# Patient Record
Sex: FEMALE | Race: WHITE | URBAN - METROPOLITAN AREA
[De-identification: names, ages, dates, MRNs, and addresses within clinical notes are randomized per-mention and may not be internally consistent; named-entity substitution may affect disease eponyms.]

---

## 2023-09-28 ENCOUNTER — EMERGENCY (EMERGENCY)
Facility: HOSPITAL | Age: 38
LOS: 1 days | Discharge: HOME | End: 2023-09-28
Attending: EMERGENCY MEDICINE | Admitting: EMERGENCY MEDICINE
Payer: MEDICAID

## 2023-09-28 VITALS
DIASTOLIC BLOOD PRESSURE: 72 MMHG | RESPIRATION RATE: 16 BRPM | TEMPERATURE: 99 F | HEART RATE: 96 BPM | SYSTOLIC BLOOD PRESSURE: 119 MMHG | OXYGEN SATURATION: 100 %

## 2023-09-28 LAB
ALBUMIN SERPL ELPH-MCNC: 4.9 G/DL — SIGNIFICANT CHANGE UP (ref 3.3–5)
ALP SERPL-CCNC: 59 U/L — SIGNIFICANT CHANGE UP (ref 40–120)
ALT FLD-CCNC: 17 U/L — SIGNIFICANT CHANGE UP (ref 4–33)
ANION GAP SERPL CALC-SCNC: 13 MMOL/L — SIGNIFICANT CHANGE UP (ref 7–14)
APTT BLD: 32.1 SEC — SIGNIFICANT CHANGE UP (ref 24.5–35.6)
AST SERPL-CCNC: 20 U/L — SIGNIFICANT CHANGE UP (ref 4–32)
BASOPHILS # BLD AUTO: 0.04 K/UL — SIGNIFICANT CHANGE UP (ref 0–0.2)
BASOPHILS NFR BLD AUTO: 0.4 % — SIGNIFICANT CHANGE UP (ref 0–2)
BILIRUB SERPL-MCNC: 0.3 MG/DL — SIGNIFICANT CHANGE UP (ref 0.2–1.2)
BUN SERPL-MCNC: 15 MG/DL — SIGNIFICANT CHANGE UP (ref 7–23)
CALCIUM SERPL-MCNC: 9.7 MG/DL — SIGNIFICANT CHANGE UP (ref 8.4–10.5)
CHLORIDE SERPL-SCNC: 105 MMOL/L — SIGNIFICANT CHANGE UP (ref 98–107)
CO2 SERPL-SCNC: 19 MMOL/L — LOW (ref 22–31)
CREAT SERPL-MCNC: 0.59 MG/DL — SIGNIFICANT CHANGE UP (ref 0.5–1.3)
EGFR: 119 ML/MIN/1.73M2 — SIGNIFICANT CHANGE UP
EOSINOPHIL # BLD AUTO: 0.14 K/UL — SIGNIFICANT CHANGE UP (ref 0–0.5)
EOSINOPHIL NFR BLD AUTO: 1.4 % — SIGNIFICANT CHANGE UP (ref 0–6)
GLUCOSE SERPL-MCNC: 108 MG/DL — HIGH (ref 70–99)
HCT VFR BLD CALC: 40.9 % — SIGNIFICANT CHANGE UP (ref 34.5–45)
HGB BLD-MCNC: 14.6 G/DL — SIGNIFICANT CHANGE UP (ref 11.5–15.5)
IANC: 5.46 K/UL — SIGNIFICANT CHANGE UP (ref 1.8–7.4)
IMM GRANULOCYTES NFR BLD AUTO: 0.2 % — SIGNIFICANT CHANGE UP (ref 0–0.9)
INR BLD: 1.07 RATIO — SIGNIFICANT CHANGE UP (ref 0.85–1.18)
LYMPHOCYTES # BLD AUTO: 3.73 K/UL — HIGH (ref 1–3.3)
LYMPHOCYTES # BLD AUTO: 36.7 % — SIGNIFICANT CHANGE UP (ref 13–44)
MCHC RBC-ENTMCNC: 31.6 PG — SIGNIFICANT CHANGE UP (ref 27–34)
MCHC RBC-ENTMCNC: 35.7 GM/DL — SIGNIFICANT CHANGE UP (ref 32–36)
MCV RBC AUTO: 88.5 FL — SIGNIFICANT CHANGE UP (ref 80–100)
MONOCYTES # BLD AUTO: 0.78 K/UL — SIGNIFICANT CHANGE UP (ref 0–0.9)
MONOCYTES NFR BLD AUTO: 7.7 % — SIGNIFICANT CHANGE UP (ref 2–14)
NEUTROPHILS # BLD AUTO: 5.46 K/UL — SIGNIFICANT CHANGE UP (ref 1.8–7.4)
NEUTROPHILS NFR BLD AUTO: 53.6 % — SIGNIFICANT CHANGE UP (ref 43–77)
NRBC # BLD: 0 /100 WBCS — SIGNIFICANT CHANGE UP (ref 0–0)
NRBC # FLD: 0 K/UL — SIGNIFICANT CHANGE UP (ref 0–0)
PLATELET # BLD AUTO: 277 K/UL — SIGNIFICANT CHANGE UP (ref 150–400)
POTASSIUM SERPL-MCNC: 3.1 MMOL/L — LOW (ref 3.5–5.3)
POTASSIUM SERPL-SCNC: 3.1 MMOL/L — LOW (ref 3.5–5.3)
PROT SERPL-MCNC: 7.9 G/DL — SIGNIFICANT CHANGE UP (ref 6–8.3)
PROTHROM AB SERPL-ACNC: 12 SEC — SIGNIFICANT CHANGE UP (ref 9.5–13)
RBC # BLD: 4.62 M/UL — SIGNIFICANT CHANGE UP (ref 3.8–5.2)
RBC # FLD: 12.1 % — SIGNIFICANT CHANGE UP (ref 10.3–14.5)
SODIUM SERPL-SCNC: 137 MMOL/L — SIGNIFICANT CHANGE UP (ref 135–145)
TROPONIN T, HIGH SENSITIVITY RESULT: <6 NG/L — SIGNIFICANT CHANGE UP
WBC # BLD: 10.17 K/UL — SIGNIFICANT CHANGE UP (ref 3.8–10.5)
WBC # FLD AUTO: 10.17 K/UL — SIGNIFICANT CHANGE UP (ref 3.8–10.5)

## 2023-09-28 PROCEDURE — 70450 CT HEAD/BRAIN W/O DYE: CPT | Mod: 26,MA,59

## 2023-09-28 PROCEDURE — 70498 CT ANGIOGRAPHY NECK: CPT | Mod: 26,MA

## 2023-09-28 PROCEDURE — 0042T: CPT | Mod: MA

## 2023-09-28 PROCEDURE — 99285 EMERGENCY DEPT VISIT HI MDM: CPT

## 2023-09-28 PROCEDURE — 70496 CT ANGIOGRAPHY HEAD: CPT | Mod: 26,MA

## 2023-09-28 NOTE — CONSULT NOTE ADULT - ASSESSMENT
LKW:  NIHSS:    Baseline MRS:  Not a Teneceteplase candidate due to   Not a thrombectomy candidate due to no LVO    Impression:        Recommendations:    []  []  []  []  Discussed with Telestroke attending regarding decision against candidacy for Tenecteplase / thrombectomy. Will be formally staffed on morning rounds with attending. Recommendations will be complete once signed by attending.       LKW: 21:00 9/28  NIHSS: 6> imporved to 4 (due to effort dependent drift of all 4 extremities)  Baseline MRS: 0  Not a Tenecteplase candidate due to resolving symptoms, concern for possible seizure   Not a thrombectomy candidate due to no LVO    Impression:      Less likely acute infarct  Syncopal episode vs seizure vs migraine with neurologic features     Recommendations:    []  []  []  []  Discussed with Telestroke attending regarding decision against candidacy for Tenecteplase / thrombectomy. Will be formally staffed on morning rounds with attending. Recommendations will be complete once signed by attending.       LKW: 21:00 9/28  NIHSS: 6> improved to 4 (due to effort dependent drift of all 4 extremities)  Baseline MRS: 0  Not a Tenecteplase candidate due to resolving symptoms, concern for possible seizure   Not a thrombectomy candidate due to no LVO    Impression:      Less likely acute infarct  Syncopal episode vs seizure vs migraine with neurologic features     Recommendations:    []  1st line -  reglan 10 q6h, toradol 30 iv q8h, magnesium sulfate IV 1g, tylenol,   []  []  []      Discussed with Telestroke attending regarding decision against candidacy for Tenecteplase / thrombectomy. Will be formally staffed on morning rounds with attending. Recommendations will be complete once signed by attending.       LKW: 21:00 9/28  NIHSS: 6> improved to 4 (due to effort dependent drift of all 4 extremities)  Baseline MRS: 0  Not a Tenecteplase candidate due to resolving symptoms, concern for possible seizure   Not a thrombectomy candidate due to no LVO    Impression:      Less likely acute infarct  Syncopal episode vs seizure vs migraine with neurologic features     Recommendations:    []  1st line -  reglan 10 q6h, toradol 30 iv q8h, magnesium sulfate IV 1g, tylenol,   []   []  []      Discussed with Telestroke attending regarding decision against candidacy for Tenecteplase / thrombectomy. Will be formally staffed on morning rounds with attending. Recommendations will be complete once signed by attending. YAA MARISCAL is a 38y (1985) left handed woman with a PMHx significant for asthma, migraines, nephrolithiasis who presents as code stroke after episode of loss of consciousness and found to have facial droop.      LKW: 21:00 9/28  NIHSS: 6> improved to 4 (due to effort dependent drift of all 4 extremities)  Baseline MRS: 0  Not a Tenecteplase candidate due to resolving symptoms, concern for possible seizure   Not a thrombectomy candidate due to no LVO    Impression:      Less likely acute infarct  Syncopal episode vs seizure vs migraine with neurologic features     Recommendations:    [] Trial Migraine cocktail for headache: 1st line -  reglan 10 q6h, toradol 30 iv q8h, magnesium sulfate IV 1g, tylenol,   [] CDU for syncopal workup: EKG, telemetry, orthostatics, TTE  [] Defer additional imaging due to lower concern for acute ischemic event   [] 2 hour EEG     Discussed with Stroke fellow Bernardo Molina MD under supervision of Dr. Libman regarding decision against candidacy for Tenecteplase / thrombectomy. Will be formally staffed on morning rounds with attending. Recommendations will be complete once signed by attending. YAA MARISCAL is a 38y (1985) left handed woman with a PMHx significant for asthma, migraines, nephrolithiasis who presents as code stroke after episode of loss of consciousness and found to have facial droop.    LKW: 21:00 9/28  NIHSS: 6> improved to 0   Baseline MRS: 0  Not a Tenecteplase candidate due to resolving symptoms, concern for possible seizure   Not a thrombectomy candidate due to no LVO    Impression:      Episode of transient loss of consciousness associated with B/L face tingling and facial droop (now resolved), and headache. Non focal neuro exam, symptoms largely improved after patient given migraine cocktail. Less likely 2/2 acute ischemic infarct. Likely 2/2 vasovagal syncopal episode vs migraine with neurologic features, less likely seizure    Recommendations:    [] Trial Migraine cocktail for headache: 1st line -  reglan 10 q6h, toradol 30 iv q8h, magnesium sulfate IV 1g, tylenol,   [] CDU for syncopal workup: EKG, telemetry, orthostatics, TTE  [] Defer additional imaging due to lower concern for acute ischemic event   [] 2 hour EEG   [] Pending results of workup above, patient can ollow up with Neurology as outpatient. Patient can follow up with Dr. Ziggy Ferguson or Dr. Eliezer Munoz after discharge. Please instruct the patient/family to call [phone] to schedule an appointment within the next 1-2 weeks. Office is located at [address] [place] [address]. If pt is without insurance, patient can follow up with [facility], located in [address] [place] [address] at [address] [place] [address]. Patient/family can call [phone] to schedule this appointment.    Discussed with Stroke fellow Bernardo Molina MD under supervision of Dr. Libman regarding decision against candidacy for Tenecteplase / thrombectomy. Will be formally staffed on morning rounds with attending. Recommendations will be complete once signed by attending. YAA MARISCAL is a 38y (1985) left handed woman with a PMHx significant for asthma, migraines, nephrolithiasis who presents as code stroke after episode of loss of consciousness and found to have facial droop.    LKW: 21:00 9/28  NIHSS: 6> improved to 0   Baseline MRS: 0  Not a Tenecteplase candidate due to resolving symptoms, concern for possible seizure   Not a thrombectomy candidate due to no LVO    Impression:      Episode of transient loss of consciousness associated with B/L face tingling and facial droop (now resolved), and headache. Non focal neuro exam, symptoms largely improved after patient given migraine cocktail. Less likely 2/2 acute ischemic infarct. Likely 2/2 vasovagal syncopal episode vs migraine with neurologic features, less likely seizure    Recommendations:    [] Trial Migraine cocktail for headache: 1st line -  reglan 10 q6h, toradol 30 iv q8h, magnesium sulfate IV 1g, tylenol,   [] CDU for syncopal workup: EKG, telemetry, orthostatics, TTE  [] Defer additional imaging due to lower concern for acute ischemic event   [] 2 hour EEG   [] MRI w/ and w/o contrast epilepsy protocol  [] Pending results of workup above, patient can follow up with Neurology as outpatient. Patient can follow up with Dr. Ziggy Ferguson or Dr. Eliezer Munoz after discharge. Please instruct the patient/family to call [phone] to schedule an appointment within the next 1-2 weeks. Office is located at [address] [place] [id number]. If pt is without insurance, patient can follow up with [facility], located in [address] [place] [address] at [address] [place] [address]. Patient/family can call [phone] to schedule this appointment.    Discussed with Stroke fellow Bernardo Molina MD under supervision of Dr. Libman regarding decision against candidacy for Tenecteplase / thrombectomy. Will be formally staffed on morning rounds with attending. Recommendations will be complete once signed by attending. YAA MARISCAL is a 38y (1985) left handed woman with a PMHx significant for asthma, migraines, nephrolithiasis who presents as code stroke after episode of loss of consciousness and found to have facial droop.    LKW: 21:00 9/28  NIHSS: 6> improved to 0   Baseline MRS: 0  Not a Tenecteplase candidate due to resolving symptoms, concern for possible seizure   Not a thrombectomy candidate due to no LVO    Impression:      Episode of transient loss of consciousness associated with B/L face tingling and facial droop (now resolved), and headache. Non focal neuro exam, symptoms largely improved after patient given migraine cocktail. Not consistent with acute ischemic infarct. Likely 2/2 vasovagal syncopal episode vs migraine with neurologic features, seizure    Recommendations:    [] Trial Migraine cocktail for headache: 1st line -  reglan 10 q6h, toradol 30 iv q8h, magnesium sulfate IV 1g, tylenol,   [] CDU for syncopal workup: EKG, telemetry, orthostatics, TTE  [] Defer additional imaging due to lower concern for acute ischemic event   [] prolonged EEG today - already hooked up this AM.  [] MRI w/ and w/o contrast epilepsy protocol  [] Pending results of workup above, patient can follow up with Neurology as outpatient. Patient can follow up with Dr. Ziggy Ferguson or Dr. Eliezer Munoz after discharge. Please instruct the patient/family to call [phone] to schedule an appointment within the next 1-2 weeks. Office is located at [address] [place] [id number]. If pt is without insurance, patient can follow up with [facility], located in [address] [place] [address] at [address] [place] [address]. Patient/family can call [phone] to schedule this appointment.    Discussed with Stroke fellow Bernardo Molina MD under supervision of Dr. Libman regarding decision against candidacy for Tenecteplase / thrombectomy. Will be formally staffed on morning rounds with attending. Recommendations will be complete once signed by attending.

## 2023-09-28 NOTE — ED ADULT NURSE NOTE - OBJECTIVE STATEMENT
Zach RN note- Patient arrives to the ED as a code stroke for right sided facial droop and generalized weakness starting around 9pm after a fall. Per EMS patient fell at home and her pulsox was 86% on RA, 3L nasal cannula applied with effect. Stroke scale as documented. 18g IV placed to left arm. Labs sent as ordered. Report given to primary RN. Upon arrival to ED patient noted to have left sided facial droop. Drift noted in all four extremities. Safety maintained. Patient stable upon exiting the room.

## 2023-09-28 NOTE — ED ADULT NURSE NOTE - NSFALLRISKINTERV_ED_ALL_ED

## 2023-09-28 NOTE — ED ADULT TRIAGE NOTE - CHIEF COMPLAINT QUOTE
Code Stroke called- Pt presents with left sided facial droop. LKW 2100. felt dizzy and headache prior. no phx

## 2023-09-28 NOTE — CONSULT NOTE ADULT - ATTENDING COMMENTS
Per  - 5 minutes of LOC and then confused.  The patient remembers feeling B facial paresthesias and then "waking up" with her family around her.  She was definitely confused and had difficulty speaking.  No tongue bite and no incontinence. No jerking noted.  No h/o seizures, significant head trauma, developmental delay.  Sister and grandmother have seizures.     Mental status   Awake, alert, and oriented to person, time and place, Normal attention span and concentration, No aphasia.  No neglect.        Cranial Nerves   II: VFF    III, IV, VI: PERRL, EOMI.     VII: Facial strength is normal B/L.   VIII: Gross hearing is intact.     IX, X: Palate is midline and elevates symmetrically.     XI: Trapezius normal strength.     XII: Tongue midline without atrophy or fasciculations.     Motor exam    Muscle tone - no evidence of rigidity or resistance in all 4 extremities.    No atrophy or fasciculations   Muscle Strength: arms and legs, proximal and distal flexors and extensors are normal     No UE drift.    Reflexes   All present, normal, and symmetrical.     Plantars right: mute.     Plantars left: mute.       Coordination   Finger to nose: Normal.    Heel to shin: Normal.       A/P  Ms. Stanley is a 37 yo woman with an episode of prolonged LOC (5 minutes) followed by a period of confusion.    The differential diagnosis includes: not consistent with syncope, possible seizure.   Prolonged EEG today.  MRI brain w/wo gado, seizure protocol.  D/W patient and CDU team.   H/O migraine - please allow her to take her own ubrogepant PRN migraine.  Thank you

## 2023-09-28 NOTE — CONSULT NOTE ADULT - SUBJECTIVE AND OBJECTIVE BOX
Neurology - Consult Note    -  Spectra: [phone] ([facility]), [phone] ([facility])  -    HPI: Patient YAA MARISCAL is a 38y (1985) left handed woman with a PMHx significant for asthma, migraines, nephrolithiasis  who presents as code stroke after episode of loss of consciousness and found to have facial droop. No headstrike noted.   Initially EMS reported patient had R facial droop, however in EMS. NIHSS 5 in the field. Vitals were normal, except O2sat 86% initially. improved on 2L NC. Patient has never had any prior syncopal episodes. Reports she woke up this morning feeling unwell.     Whole face tingling  Drift of all extremities  L droop when smiling     NIHSS: 6  LKW: 9pm  MRS: 0       Review of Systems:   CONSTITUTIONAL: No fevers or chills  EYES AND ENT: No visual changes or no throat pain   NECK: No pain or stiffness  RESPIRATORY: No hemoptysis or shortness of breath  CARDIOVASCULAR: No chest pain or palpitations  GASTROINTESTINAL: No melena or hematochezia  GENITOURINARY: No dysuria or hematuria  NEUROLOGICAL: +As stated in HPI above  SKIN: No itching, burning, rashes, or lesions   All other review of systems is negative unless indicated above.    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 Independent with ADLs and     Medications:  MEDICATIONS  (STANDING):    MEDICATIONS  (PRN):    Vitals:  T(C): --  HR: --  BP: --  RR: --  SpO2: --    Physical Examination: INCOMPLETE  General - NAD  Cardiovascular - Peripheral pulses palpable, no edema  Eyes - Fundoscopy with flat, sharp optic discs and no hemorrhage or exudates; Fundoscopy not well visualized; Fundoscopy not performed due to safety precautions in the setting of the COVID-19 pandemic    Neurologic Exam:  Mental status - Awake, Alert, Oriented to person, place, and time. Speech fluent, repetition and naming intact. Follows simple and complex commands. Attention/concentration, recent and remote memory (including registration and recall), and fund of knowledge intact    Cranial nerves - PERRLA, VFF, EOMI, face sensation (V1-V3) intact b/l, facial strength intact without asymmetry b/l, hearing intact b/l, palate with symmetric elevation, trapezius OR sternocleidomastiod 5/5 strength b/l, tongue midline on protrusion with full lateral movement    Motor - Normal bulk and tone throughout. No pronator drift.  Strength testing            Deltoid      Biceps      Triceps     Wrist Extension    Wrist Flexion     Interossei         R            5                 5               5                     5                              5                        5                 5  L             5                 5               5                     5                              5                        5                 5              Hip Flexion    Hip Extension    Knee Flexion    Knee Extension    Dorsiflexion    Plantar Flexion  R              5                           5                       5                           5                            5                          5  L              5                           5                        5                           5                            5                          5    Sensation - Light touch/temperature OR pain/vibration intact throughout    DTR's -             Biceps      Triceps     Brachioradialis      Patellar    Ankle    Toes/plantar response  R             2+             2+                  2+                       2+            2+                 Down  L              2+             2+                 2+                        2+           2+                 Down    Coordination - Finger to Nose intact b/l. No tremors appreciated    Gait and station - Not assessed     Labs:      CAPILLARY BLOOD GLUCOSE        Radiology:     Neurology - Consult Note    -  Spectra: [phone] ([facility]), [phone] ([facility])  -    HPI: Patient YAA MARISCAL is a 38y (1985) left handed woman with a PMHx significant for asthma, migraines, nephrolithiasis  who presents as code stroke after episode of loss of consciousness and found to have facial droop. No headstrike or seizure like activity noted.   Initially EMS reported patient had R facial droop, however in ED, noted to have L facial droop. NIHSS 5 in the field. Vitals were normal, except O2sat 86% initially, which improved on 2L NC. Patient has never had any prior syncopal episodes. Reports she woke up this morning feeling unwell per the . Not on any AC/AP.     NIHSS: 6 (sensory deficit; Whole face tingling, 1 each for Drift of all extremities, 1 for L droop when smiling)   LKW: 9pm  MRS: 0     Review of Systems:   CONSTITUTIONAL: No fevers or chills  EYES AND ENT: No visual changes or no throat pain   NECK: No pain or stiffness  RESPIRATORY: No hemoptysis or shortness of breath  CARDIOVASCULAR: No chest pain or palpitations  GASTROINTESTINAL: No melena or hematochezia  GENITOURINARY: No dysuria or hematuria  NEUROLOGICAL: +As stated in HPI above  SKIN: No itching, burning, rashes, or lesions   All other review of systems is negative unless indicated above.    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 Independent with ADLs and     Medications:  MEDICATIONS  (STANDING):    MEDICATIONS  (PRN):    Vitals:  T(C): --  HR: --  BP: --  RR: --  SpO2: --    Physical Examination:   General - NAD  Cardiovascular - Peripheral pulses palpable, no edema  Eyes -  Fundoscopy not performed due to safety precautions in the setting of the COVID-19 pandemic    Neurologic Exam:  Mental status - Awake, Alert, Oriented to person, place, and time. Speech fluent, repetition and naming intact. Follows simple and complex commands. Attention/concentration, recent and remote memory (including registration and recall), and fund of knowledge intact    Cranial nerves - PERRLA, VFF, EOMI, face sensation (V1-V3) intact b/l, facial strength intact without asymmetry b/l, hearing intact b/l, palate with symmetric elevation, trapezius 5/5 strength b/l, tongue midline on protrusion with full lateral movement    Motor - Normal bulk and tone throughout. No pronator drift.  Strength testing            Deltoid      Biceps      Triceps     Wrist Extension    Wrist Flexion     Interossei         R            5                 5               5                     5                              5                        5                 5  L             5                 5               5                     5                              5                        5                 5              Hip Flexion    Hip Extension    Knee Flexion    Knee Extension    Dorsiflexion    Plantar Flexion  R              5                           5                       5                           5                            5                          5  L              5                           5                        5                           5                            5                          5    Sensation - Light touch/temperature OR pain/vibration intact throughout    DTR's -             Biceps      Triceps     Brachioradialis      Patellar    Ankle    Toes/plantar response  R             2+             2+                  2+                       2+            2+                 Down  L              2+             2+                 2+                        2+           2+                 Down    Coordination - Finger to Nose intact b/l. No tremors appreciated    Gait and station - Not assessed     Labs:      CAPILLARY BLOOD GLUCOSE        Radiology:     Neurology - Consult Note    -  Spectra: [phone] ([facility]), [phone] ([facility])  -    HPI: Patient YAA MARISCAL is a 38y (1985) left handed woman with a PMHx significant for asthma, migraines, nephrolithiasis who presents as code stroke after episode of loss of consciousness and found to have facial droop. No headstrike or seizure like activity noted.   Initially EMS reported patient had R facial droop, however in ED, noted to have L facial droop. NIHSS 5 in the field. Vitals were normal, except O2sat 86% initially, which improved on 2L NC. Patient has never had any prior syncopal episodes. Reports she woke up this morning feeling unwell per the . Not on any AC/AP.     NIHSS: 6 (sensory deficit; Whole face tingling, 1 each for Drift of all extremities, 1 for L droop when smiling)   LKW: 9pm  MRS: 0     Review of Systems:   CONSTITUTIONAL: No fevers or chills  EYES AND ENT: No visual changes or no throat pain   NECK: No pain or stiffness  RESPIRATORY: No hemoptysis or shortness of breath  CARDIOVASCULAR: No chest pain or palpitations  GASTROINTESTINAL: No melena or hematochezia  GENITOURINARY: No dysuria or hematuria  NEUROLOGICAL: +As stated in HPI above  SKIN: No itching, burning, rashes, or lesions   All other review of systems is negative unless indicated above.    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 Independent with ADLs and     Medications:  MEDICATIONS  (STANDING):    MEDICATIONS  (PRN):    Vitals:  T(C): --  HR: --  BP: --  RR: --  SpO2: --    Physical Examination:   General - NAD  Cardiovascular - Peripheral pulses palpable, no edema  Eyes -  Fundoscopy not performed due to safety precautions in the setting of the COVID-19 pandemic    Neurologic Exam:  Mental status - Awake, Alert, Oriented to person, place, and time. Speech fluent, repetition and naming intact. Follows simple and complex commands. Attention/concentration, recent and remote memory (including registration and recall), and fund of knowledge intact    Cranial nerves - PERRLA, VFF, EOMI, face sensation (V1-V3) intact b/l, facial strength intact without asymmetry b/l, hearing intact b/l, palate with symmetric elevation, trapezius 5/5 strength b/l, tongue midline on protrusion with full lateral movement    Motor - Normal bulk and tone throughout. No pronator drift.  Strength testing            Deltoid      Biceps      Triceps     Wrist Extension    Wrist Flexion     Interossei         R            5                 5               5                     5                              5                        5                 5  L             5                 5               5                     5                              5                        5                 5              Hip Flexion    Hip Extension    Knee Flexion    Knee Extension    Dorsiflexion    Plantar Flexion  R              5                           5                       5                           5                            5                          5  L              5                           5                        5                           5                            5                          5    Sensation - Light touch/temperature OR pain/vibration intact throughout    DTR's -             Biceps      Triceps     Brachioradialis      Patellar    Ankle    Toes/plantar response  R             2+             2+                  2+                       2+            2+                 Down  L              2+             2+                 2+                        2+           2+                 Down    Coordination - Finger to Nose intact b/l. No tremors appreciated    Gait and station - Not assessed     Labs:      CAPILLARY BLOOD GLUCOSE        Radiology:     Neurology - Consult Note    -  Spectra: [phone] ([facility]), [phone] ([facility])  -    HPI: Patient YAA MARISCAL is a 38y (1985) left handed woman with a PMHx significant for asthma, migraines, nephrolithiasis who presents as code stroke after episode of loss of consciousness and found to have facial droop. No headstrike or seizure like activity noted.   Initially EMS reported patient had R facial droop, however in ED, noted to have L facial droop. NIHSS 5 in the field. Vitals were normal, except O2sat 86% initially, which improved on 2L NC. Patient has never had any prior syncopal episodes. Reports she woke up this morning feeling unwell per the . Not on any AC/AP.     NIHSS: 6 (sensory deficit; Whole face tingling, 1 each for Drift of all extremities, 1 for L droop when smiling)   LKW: 9pm  MRS: 0     Review of Systems:   CONSTITUTIONAL: No fevers or chills  EYES AND ENT: No visual changes or no throat pain   NECK: No pain or stiffness  RESPIRATORY: No hemoptysis or shortness of breath  CARDIOVASCULAR: No chest pain or palpitations  GASTROINTESTINAL: No melena or hematochezia  GENITOURINARY: No dysuria or hematuria  NEUROLOGICAL: +As stated in HPI above  SKIN: No itching, burning, rashes, or lesions   All other review of systems is negative unless indicated above.    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 Independent with ADLs and     Medications:  MEDICATIONS  (STANDING):    MEDICATIONS  (PRN):    Vitals:  T(C): --  HR: --  BP: --  RR: --  SpO2: --    Physical Examination:   General - NAD  Cardiovascular - Peripheral pulses palpable, no edema  Eyes -  Fundoscopy not performed due to safety precautions in the setting of the COVID-19 pandemic    Neurologic Exam:  Mental status - Awake, Alert, Oriented to person, place, and time. Speech fluent, repetition and naming intact. Follows simple and complex commands. Attention/concentration, recent and remote memory (including registration and recall), and fund of knowledge intact    Cranial nerves - PERRLA, VFF, EOMI, face sensation (V1-V3) intact b/l, facial strength intact without asymmetry b/l, hearing intact b/l, palate with symmetric elevation, trapezius 5/5 strength b/l, tongue midline on protrusion with full lateral movement    Motor - Normal bulk and tone throughout. No pronator drift.  Strength testing            Deltoid      Biceps      Triceps     Wrist Extension    Wrist Flexion     Interossei         R            5                 5               5                     5                              5                        5                 5  L             5                 5               5                     5                              5                        5                 5              Hip Flexion    Hip Extension    Knee Flexion    Knee Extension    Dorsiflexion    Plantar Flexion  R              5                           5                       5                           5                            5                          5  L              5                           5                        5                           5                            5                          5    Sensation - Light touch intact throughout    DTR's -             Biceps      Triceps     Brachioradialis      Patellar    Ankle    Toes/plantar response  R             2+             2+                  2+                       2+            2+                 Down  L              2+             2+                 2+                        2+           2+                 Down    Coordination - Finger to Nose intact b/l. No tremors appreciated    Gait and station - Not assessed     Labs:      CAPILLARY BLOOD GLUCOSE        Radiology:     Neurology - Consult Note    -  Spectra: [phone] ([facility]), [phone] ([facility])  -    HPI: Patient YAA MARISCAL is a 38y (1985) left handed woman with a PMHx significant for asthma, migraines, nephrolithiasis who presents as code stroke after episode of loss of consciousness lasting 5 minutes and found to have facial droop. Patient says she felt her whole face start to tingle, then she lost consciousness. Denies any prodromal symptoms such as warmth or palpitations. No head strike or seizure like activity noted, bowel/ bladder incontinence noted.  reports he found patient on the floor in the kitchen.  Initially EMS, reported patient had R facial droop, however in ED, noted to have L facial droop. NIHSS 5 in the field. Vitals were normal, except O2sat 86% initially, which improved on 2L NC. Patient has never had any prior syncopal episodes or history of seizures. Reports she woke up this morning feeling unwell per the . Not on any AC/AP. She takes ubrelvy daily for migraines Patient reports headache which is similar to her prior migraines (frontal and retro-orbital associated with phonophobia).    NIHSS: 6 (sensory deficit; whole face tingling, 1 each for drift of all extremities, 1 for L droop when smiling) > improved to 0  LKW: 9pm  MRS: 0     Review of Systems:   CONSTITUTIONAL: No fevers or chills  EYES AND ENT: No visual changes or no throat pain   NECK: No pain or stiffness  RESPIRATORY: No hemoptysis or shortness of breath  CARDIOVASCULAR: No chest pain or palpitations  GASTROINTESTINAL: No melena or hematochezia  GENITOURINARY: No dysuria or hematuria  NEUROLOGICAL: +As stated in HPI above  SKIN: No itching, burning, rashes, or lesions   All other review of systems is negative unless indicated above.    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 Independent with ADLs and     Medications:  MEDICATIONS  (STANDING):    MEDICATIONS  (PRN):    Vitals:  T(C): --  HR: --  BP: --  RR: --  SpO2: --    Physical Examination:   General - NAD  Cardiovascular - Peripheral pulses palpable, no edema  Eyes -  Fundoscopy not performed due to safety precautions in the setting of the COVID-19 pandemic    Neurologic Exam:  Mental status - Awake, Alert, Oriented to person, place, and time. Speech fluent, repetition and naming intact. Follows simple and complex commands. Attention/concentration, recent and remote memory (including registration and recall), and fund of knowledge intact    Cranial nerves - PERRLA, VFF, EOMI, face sensation (V1-V3) intact b/l, facial strength intact without asymmetry b/l, hearing intact b/l, palate with symmetric elevation, trapezius 5/5 strength b/l, tongue midline on protrusion with full lateral movement    Motor - Normal bulk and tone throughout. No pronator drift.  Strength testing            Deltoid      Biceps      Triceps     Wrist Extension    Wrist Flexion     Interossei         R            5                 5               5                     5                              5                        5                 5  L             5                 5               5                     5                              5                        5                 5              Hip Flexion    Hip Extension    Knee Flexion    Knee Extension    Dorsiflexion    Plantar Flexion  R              5                           5                       5                           5                            5                          5  L              5                           5                        5                           5                            5                          5    Sensation - Light touch intact throughout    DTR's -             Biceps      Triceps     Brachioradialis      Patellar    Ankle    Toes/plantar response  R             2+             2+                  2+                       2+            2+                 Down  L              2+             2+                 2+                        2+           2+                 Down    Coordination - Finger to Nose intact b/l. No tremors appreciated    Gait and station - Not assessed     Labs:      CAPILLARY BLOOD GLUCOSE        Radiology:     Neurology - Consult Note    -  Spectra: [phone] ([facility]), [phone] ([facility])  -    HPI: Patient YAA MARISCAL is a 38y (1985) left handed woman with a PMHx significant for asthma, migraines, nephrolithiasis who presents as code stroke after episode of loss of consciousness lasting 5 minutes and found to have facial droop. Patient says she felt her whole face start to tingle, then she lost consciousness. Denies any prodromal symptoms such as warmth or palpitations. No head strike or seizure like activity noted, bowel/ bladder incontinence noted.  reports he found patient on the floor in the kitchen.  Initially EMS, reported patient had R facial droop, however in ED, noted to have L facial droop. NIHSS 5 in the field. Vitals were normal, except O2sat 86% initially, which improved on 2L NC. Patient has never had any prior syncopal episodes or history of seizures. Reports she woke up this morning feeling unwell per the . Not on any AC/AP. She takes ubrelvy daily for migraines Patient reports headache which is similar to her prior migraines (frontal and retro-orbital associated with phonophobia).    NIHSS: 6 (sensory deficit; whole face tingling, 1 each for drift of all extremities, 1 for L droop when smiling) > improved to 0  LKW: 9pm  MRS: 0     Review of Systems:   CONSTITUTIONAL: No fevers or chills  EYES AND ENT: No visual changes or no throat pain   NECK: No pain or stiffness  RESPIRATORY: No hemoptysis or shortness of breath  CARDIOVASCULAR: No chest pain or palpitations  GASTROINTESTINAL: No melena or hematochezia  GENITOURINARY: No dysuria or hematuria  NEUROLOGICAL: +As stated in HPI above  SKIN: No itching, burning, rashes, or lesions   All other review of systems is negative unless indicated above.    Allergies:  No Known Allergies    PMHx/PSHx/Family Hx: As above, otherwise see below       Social Hx:  No current use of tobacco, alcohol, or illicit drugs  Lives with  and children, she is a stay at home mom. Independent with ADLs and able to ambulate independently    Medications:  MEDICATIONS  (STANDING):  MEDICATIONS  (PRN):    Vitals:    T(C): 37.1 (09-28-23 @ 23:52), Max: 37.2 (09-28-23 @ 22:33)  HR: 92 (09-28-23 @ 23:52) (92 - 96)  BP: 111/67 (09-28-23 @ 23:52) (111/67 - 119/72)  RR: 16 (09-28-23 @ 23:52) (16 - 16)  SpO2: 99% (09-28-23 @ 23:52) (99% - 100%)    Physical Examination:   General - NAD  Cardiovascular - Peripheral pulses palpable, no edema  Eyes -  Fundoscopy not performed due to safety precautions in the setting of the COVID-19 pandemic    Neurologic Exam:  Mental status - Awake, Alert, Oriented to person, place, and time. Speech fluent, repetition and naming intact. Follows simple and complex commands. Attention/concentration, recent and remote memory (including registration and recall), and fund of knowledge intact    Cranial nerves - PERRLA, VFF, EOMI, face sensation (V1-V3) intact b/l, facial strength intact without asymmetry b/l, hearing intact b/l, palate with symmetric elevation, trapezius 5/5 strength b/l, tongue midline on protrusion with full lateral movement    Motor - Normal bulk and tone throughout. No pronator drift.  Strength testing            Deltoid      Biceps      Triceps     Wrist Extension    Wrist Flexion     Interossei         R            5                 5               5                     5                              5                        5                 5  L             5                 5               5                     5                              5                        5                 5              Hip Flexion    Hip Extension    Knee Flexion    Knee Extension    Dorsiflexion    Plantar Flexion  R              5                           5                       5                           5                            5                          5  L              5                           5                        5                           5                            5                          5    Sensation - Light touch intact throughout    DTR's -             Biceps      Triceps     Brachioradialis      Patellar    Ankle    Toes/plantar response  R             2+             2+                  2+                       2+            2+                 Down  L              2+             2+                 2+                        2+           2+                 Down    Coordination - Finger to Nose intact b/l. No tremors appreciated    Gait and station - Not assessed     Labs:    .  LABS:                         14.6   10.17 )-----------( 277      ( 28 Sep 2023 22:00 )             40.9     09-28    137  |  105  |  15  ----------------------------<  108<H>  3.1<L>   |  19<L>  |  0.59    Ca    9.7      28 Sep 2023 22:00    TPro  7.9  /  Alb  4.9  /  TBili  0.3  /  DBili  x   /  AST  20  /  ALT  17  /  AlkPhos  59  09-28    PT/INR - ( 28 Sep 2023 22:00 )   PT: 12.0 sec;   INR: 1.07 ratio         PTT - ( 28 Sep 2023 22:00 )  PTT:32.1 sec  Urinalysis Basic - ( 28 Sep 2023 22:00 )    Color: x / Appearance: x / SG: x / pH: x  Gluc: 108 mg/dL / Ketone: x  / Bili: x / Urobili: x   Blood: x / Protein: x / Nitrite: x   Leuk Esterase: x / RBC: x / WBC x   Sq Epi: x / Non Sq Epi: x / Bacteria: x      RADIOLOGY, EKG & ADDITIONAL TESTS:     CTH  IMPRESSION:    No acute intracranial hemorrhage, mass effect, or CT evidence of an acute   vascular territorial infarct.  Mild cerebral and cerebellar volume loss for age.      CT Brain Perfusion Maps Stroke (09.28.23 @ 22:20)   CT ANGIOGRAPHY:    There is a three-vessel aortic arch. The common carotid arteries are   patent from the origins to the bifurcations. There is no significant   atherosclerotic disease at the carotid bifurcations. The cervical   internal carotid arteries are patent without stenosis based on NASCET   criteria. The cervical vertebral arteries are patent from the origins to   the skull base. Vertebral arteries are codominant. There is no evidence   of cervical carotid or vertebral artery dissection.    The skull base and intracranial carotid arteries are patent without   significant stenosis. The proximal MCAs and ACAs are patent without   significant stenosis. The anterior communicating artery is visualized.   Distal TAMIKA and MCA branches are grossly symmetric.    The skull base and intradural vertebral arteries and basilar artery are   patent without significant stenosis. The proximal PCAs are patent without   significant stenosis. Bilateral posterior communicating arteries are   visualized. The superior cerebellar artery origins, a right AICA/PICA, a   left AICA, and a left PICA are identified.    There is no evidence of an intracranial arterial aneurysm or arterial   venous malformation on CTA.    Intracranial superficial and deep venous sinus system are grossly   unremarkable.    The regional soft tissues of the neck are otherwise unremarkable. The   lung apices are clear. There are mild degenerative changes of the spine.    CT PERFUSION:    Perfusion parameters are reported as follows:    CBF < 30%: 0 mL  TMax > 6s: 0 mL  Mismatch volume: 0 mL  Mismatch ratio: None    IMPRESSION:    CTA NECK: No stenosis of the cervical carotid arteries based on NASCET   criteria. Patent cervical vertebral arteries. No evidence of cervical   carotid or vertebral artery dissection.    CTA HEAD: No high-grade stenosis or occlusion of the major proximal   arterial branches.    CT PERFUSION: No evidence of a core infarct or tissue at risk.   Neurology - Consult Note    -  Spectra: [phone] ([facility]), [phone] ([facility])  -    HPI: Patient YAA MARISCAL is a 38y (1985) left handed woman with a PMHx significant for asthma, migraines, nephrolithiasis who presents as code stroke after episode of loss of consciousness lasting 5 minutes and found to have facial droop. Patient says she felt her whole face start to tingle, then she lost consciousness. Denies any prodromal symptoms such as warmth or palpitations. No head strike or seizure like activity noted, bowel/ bladder incontinence noted.  reports he found patient on the floor in the kitchen.  Initially EMS, reported patient had R facial droop, however in ED, noted to have L facial droop. NIHSS 5 in the field. Vitals were normal, except O2sat 86% initially, which improved on 2L NC. Patient has never had any prior syncopal episodes or history of seizures. Reports she woke up this morning feeling unwell per the . Not on any AC/AP. She takes Ubrelvy daily for migraines Patient reports headache which is similar to her prior migraines (frontal and retro-orbital associated with phonophobia). Family hx significant for grandmother and sister who has epilepsy/ unspecified seizure disorder.     NIHSS: 6 (sensory deficit; whole face tingling, 1 each for drift of all extremities, 1 for L droop when smiling) > improved to 0  LKW: 9pm  MRS: 0     Review of Systems:   CONSTITUTIONAL: No fevers or chills  EYES AND ENT: No visual changes or no throat pain   NECK: No pain or stiffness  RESPIRATORY: No hemoptysis or shortness of breath  CARDIOVASCULAR: No chest pain or palpitations  GASTROINTESTINAL: No melena or hematochezia  GENITOURINARY: No dysuria or hematuria  NEUROLOGICAL: +As stated in HPI above  SKIN: No itching, burning, rashes, or lesions   All other review of systems is negative unless indicated above.    Allergies:  No Known Allergies    PMHx/PSHx/Family Hx: As above, otherwise see below       Social Hx:  No current use of tobacco, alcohol, or illicit drugs  Lives with  and children, she is a stay at home mom. Independent with ADLs and able to ambulate independently    Medications:  MEDICATIONS  (STANDING):  MEDICATIONS  (PRN):    Vitals:    T(C): 37.1 (09-28-23 @ 23:52), Max: 37.2 (09-28-23 @ 22:33)  HR: 92 (09-28-23 @ 23:52) (92 - 96)  BP: 111/67 (09-28-23 @ 23:52) (111/67 - 119/72)  RR: 16 (09-28-23 @ 23:52) (16 - 16)  SpO2: 99% (09-28-23 @ 23:52) (99% - 100%)    Physical Examination:   General - NAD  Cardiovascular - Peripheral pulses palpable, no edema  Eyes -  Fundoscopy not performed due to safety precautions in the setting of the COVID-19 pandemic    Neurologic Exam:  Mental status - Awake, Alert, Oriented to person, place, and time. Speech fluent, repetition and naming intact. Follows simple and complex commands. Attention/concentration, recent and remote memory (including registration and recall), and fund of knowledge intact    Cranial nerves - PERRLA, VFF, EOMI, face sensation (V1-V3) intact b/l, facial strength intact without asymmetry b/l, hearing intact b/l, palate with symmetric elevation, trapezius 5/5 strength b/l, tongue midline on protrusion with full lateral movement    Motor - Normal bulk and tone throughout. No pronator drift.  Strength testing            Deltoid      Biceps      Triceps     Wrist Extension    Wrist Flexion     Interossei         R            5                 5               5                     5                              5                        5                 5  L             5                 5               5                     5                              5                        5                 5              Hip Flexion    Hip Extension    Knee Flexion    Knee Extension    Dorsiflexion    Plantar Flexion  R              5                           5                       5                           5                            5                          5  L              5                           5                        5                           5                            5                          5    Sensation - Light touch intact throughout    DTR's -             Biceps      Triceps     Brachioradialis      Patellar    Ankle    Toes/plantar response  R             2+             2+                  2+                       2+            2+                 Down  L              2+             2+                 2+                        2+           2+                 Down    Coordination - Finger to Nose intact b/l. No tremors appreciated    Gait and station - Not assessed     Labs:    .  LABS:                         14.6   10.17 )-----------( 277      ( 28 Sep 2023 22:00 )             40.9     09-28    137  |  105  |  15  ----------------------------<  108<H>  3.1<L>   |  19<L>  |  0.59    Ca    9.7      28 Sep 2023 22:00    TPro  7.9  /  Alb  4.9  /  TBili  0.3  /  DBili  x   /  AST  20  /  ALT  17  /  AlkPhos  59  09-28    PT/INR - ( 28 Sep 2023 22:00 )   PT: 12.0 sec;   INR: 1.07 ratio         PTT - ( 28 Sep 2023 22:00 )  PTT:32.1 sec  Urinalysis Basic - ( 28 Sep 2023 22:00 )    Color: x / Appearance: x / SG: x / pH: x  Gluc: 108 mg/dL / Ketone: x  / Bili: x / Urobili: x   Blood: x / Protein: x / Nitrite: x   Leuk Esterase: x / RBC: x / WBC x   Sq Epi: x / Non Sq Epi: x / Bacteria: x      RADIOLOGY, EKG & ADDITIONAL TESTS:     CTH  IMPRESSION:    No acute intracranial hemorrhage, mass effect, or CT evidence of an acute   vascular territorial infarct.  Mild cerebral and cerebellar volume loss for age.      CT Brain Perfusion Maps Stroke (09.28.23 @ 22:20)   CT ANGIOGRAPHY:    There is a three-vessel aortic arch. The common carotid arteries are   patent from the origins to the bifurcations. There is no significant   atherosclerotic disease at the carotid bifurcations. The cervical   internal carotid arteries are patent without stenosis based on NASCET   criteria. The cervical vertebral arteries are patent from the origins to   the skull base. Vertebral arteries are codominant. There is no evidence   of cervical carotid or vertebral artery dissection.    The skull base and intracranial carotid arteries are patent without   significant stenosis. The proximal MCAs and ACAs are patent without   significant stenosis. The anterior communicating artery is visualized.   Distal TAMIKA and MCA branches are grossly symmetric.    The skull base and intradural vertebral arteries and basilar artery are   patent without significant stenosis. The proximal PCAs are patent without   significant stenosis. Bilateral posterior communicating arteries are   visualized. The superior cerebellar artery origins, a right AICA/PICA, a   left AICA, and a left PICA are identified.    There is no evidence of an intracranial arterial aneurysm or arterial   venous malformation on CTA.    Intracranial superficial and deep venous sinus system are grossly   unremarkable.    The regional soft tissues of the neck are otherwise unremarkable. The   lung apices are clear. There are mild degenerative changes of the spine.    CT PERFUSION:    Perfusion parameters are reported as follows:    CBF < 30%: 0 mL  TMax > 6s: 0 mL  Mismatch volume: 0 mL  Mismatch ratio: None    IMPRESSION:    CTA NECK: No stenosis of the cervical carotid arteries based on NASCET   criteria. Patent cervical vertebral arteries. No evidence of cervical   carotid or vertebral artery dissection.    CTA HEAD: No high-grade stenosis or occlusion of the major proximal   arterial branches.    CT PERFUSION: No evidence of a core infarct or tissue at risk.

## 2023-09-29 PROCEDURE — 93010 ELECTROCARDIOGRAM REPORT: CPT

## 2023-09-29 PROCEDURE — 95718 EEG PHYS/QHP 2-12 HR W/VEEG: CPT

## 2023-09-29 PROCEDURE — 70553 MRI BRAIN STEM W/O & W/DYE: CPT | Mod: 26,MA

## 2023-09-29 PROCEDURE — 93306 TTE W/DOPPLER COMPLETE: CPT | Mod: 26

## 2023-09-29 PROCEDURE — 99236 HOSP IP/OBS SAME DATE HI 85: CPT

## 2023-09-29 PROCEDURE — 99285 EMERGENCY DEPT VISIT HI MDM: CPT

## 2023-09-29 RX ORDER — ACETAMINOPHEN 500 MG
1000 TABLET ORAL ONCE
Refills: 0 | Status: COMPLETED | OUTPATIENT
Start: 2023-09-29 | End: 2023-09-29

## 2023-09-29 RX ORDER — MAGNESIUM SULFATE 500 MG/ML
1 VIAL (ML) INJECTION ONCE
Refills: 0 | Status: COMPLETED | OUTPATIENT
Start: 2023-09-29 | End: 2023-09-29

## 2023-09-29 RX ORDER — KETOROLAC TROMETHAMINE 30 MG/ML
30 SYRINGE (ML) INJECTION EVERY 6 HOURS
Refills: 0 | Status: DISCONTINUED | OUTPATIENT
Start: 2023-09-29 | End: 2023-09-29

## 2023-09-29 RX ORDER — POTASSIUM CHLORIDE 20 MEQ
40 PACKET (EA) ORAL ONCE
Refills: 0 | Status: COMPLETED | OUTPATIENT
Start: 2023-09-29 | End: 2023-09-29

## 2023-09-29 RX ORDER — SODIUM CHLORIDE 9 MG/ML
1000 INJECTION INTRAMUSCULAR; INTRAVENOUS; SUBCUTANEOUS
Refills: 0 | Status: DISCONTINUED | OUTPATIENT
Start: 2023-09-29 | End: 2023-10-03

## 2023-09-29 RX ORDER — ACETAMINOPHEN 500 MG
975 TABLET ORAL ONCE
Refills: 0 | Status: COMPLETED | OUTPATIENT
Start: 2023-09-29 | End: 2023-09-29

## 2023-09-29 RX ORDER — METOCLOPRAMIDE HCL 10 MG
10 TABLET ORAL ONCE
Refills: 0 | Status: COMPLETED | OUTPATIENT
Start: 2023-09-29 | End: 2023-09-29

## 2023-09-29 RX ORDER — POTASSIUM CHLORIDE 20 MEQ
10 PACKET (EA) ORAL
Refills: 0 | Status: DISCONTINUED | OUTPATIENT
Start: 2023-09-29 | End: 2023-09-29

## 2023-09-29 RX ORDER — SODIUM CHLORIDE 9 MG/ML
1000 INJECTION INTRAMUSCULAR; INTRAVENOUS; SUBCUTANEOUS ONCE
Refills: 0 | Status: COMPLETED | OUTPATIENT
Start: 2023-09-29 | End: 2023-09-29

## 2023-09-29 RX ORDER — METOCLOPRAMIDE HCL 10 MG
10 TABLET ORAL EVERY 6 HOURS
Refills: 0 | Status: DISCONTINUED | OUTPATIENT
Start: 2023-09-29 | End: 2023-10-03

## 2023-09-29 RX ADMIN — Medication 30 MILLIGRAM(S): at 20:38

## 2023-09-29 RX ADMIN — Medication 40 MILLIEQUIVALENT(S): at 07:04

## 2023-09-29 RX ADMIN — SODIUM CHLORIDE 125 MILLILITER(S): 9 INJECTION INTRAMUSCULAR; INTRAVENOUS; SUBCUTANEOUS at 15:43

## 2023-09-29 RX ADMIN — SODIUM CHLORIDE 1000 MILLILITER(S): 9 INJECTION INTRAMUSCULAR; INTRAVENOUS; SUBCUTANEOUS at 00:18

## 2023-09-29 RX ADMIN — Medication 30 MILLIGRAM(S): at 20:08

## 2023-09-29 RX ADMIN — Medication 975 MILLIGRAM(S): at 18:22

## 2023-09-29 RX ADMIN — Medication 10 MILLIGRAM(S): at 00:19

## 2023-09-29 RX ADMIN — Medication 10 MILLIGRAM(S): at 20:08

## 2023-09-29 RX ADMIN — Medication 400 MILLIGRAM(S): at 00:12

## 2023-09-29 RX ADMIN — Medication 100 GRAM(S): at 20:27

## 2023-09-29 RX ADMIN — Medication 40 MILLIEQUIVALENT(S): at 03:20

## 2023-09-29 RX ADMIN — SODIUM CHLORIDE 125 MILLILITER(S): 9 INJECTION INTRAMUSCULAR; INTRAVENOUS; SUBCUTANEOUS at 05:30

## 2023-09-29 RX ADMIN — Medication 100 GRAM(S): at 05:28

## 2023-09-29 NOTE — ED CDU PROVIDER INITIAL DAY NOTE - PROGRESS NOTE DETAILS
Patient seen and examined this AM.  Pt noted to have improved weakness on LUE and RUE, with no signs of facial droop at this times. NEURO: alert, CN 2-12 intact, reflexes nl, sensation nl, coordination nl, finger to nose nl, romberg negative, motor 5/5 RUE/LUE/RLE/LLE/EHL/Plantar flexion, no pronator drift, gait nl  Reports feeling better.  Pending EEG and neuro eval. MIGUEL Galvan: pt resting comfortably NAD, pt reports mild headache requesting Tylenol.  Pt completed EEG, MRI pending, neurology following.  Will continue to monitor. This patient was signed out to me at 1900 hrs; pt was at MRI at time of sign-out; plan to f/u MRI results and then discuss with Neurology team following pt regarding further recommendations.  In the interim, pt objectively noted to be resting comfortably; pt has been clinically stable; no issues thus far.  MRI head official radiology report states: "....IMPRESSION:  No acute intracranial abnormality or focal seizure nidus is noted.".  I discussed with Neuro covering [phone] pager; all test results were discussed including echo PFO finding; pt was given clearance for discharge from Neurology perspective, advised outpatient Neuro follow up.  I updated pt regarding the above; pt verbalizes happiness/eagerness for dc home.  Pt states headache at present time is "not a migraine"; pt states "just a mild headache" and states she overall feels improved.  I spoke with ED attending Dr. Dela Cruz who is evaluating pt bedside for dc home; states pt may be dc'd home.

## 2023-09-29 NOTE — ED ADULT NURSE REASSESSMENT NOTE - NS ED NURSE REASSESS COMMENT FT1
Received report on pt from inga RN. Pt resting in stretcher, vitals as charted. Pt NSR on cardiac monitor, tolerating room air well. Pt continues to endorse dizziness. Airway patent, able to speak in full sentences. No facial droop or drooling noted
Break Coverage RN: Pt A&Ox4, respirations equal and unlabored. Pt resting in stretcher at this time, offers no complaints. IV patent. Vitals performed and documented. Pending further plan. No acute distress noted. Safety maintained, bed in lowest position, side rails raised, call bell in reach.

## 2023-09-29 NOTE — ED PROVIDER NOTE - ATTENDING CONTRIBUTION TO CARE
38 F h/o migraines PTED with HA =prior HA on arrival stroke code called b/c of subjective arm weakness  Plan   IMAGING neuro reccs low suspicion for ischemic stroke or SAH ? HP migraine or sz  will reassess

## 2023-09-29 NOTE — ED PROVIDER NOTE - PROGRESS NOTE DETAILS
Makenzie Fam MD PGY-2: no infarct or bleed on stroke imaging. Patient reports improvement in sx. Neuro recommending CDU for spot EEG, TTE, and final neuro eval in AM.

## 2023-09-29 NOTE — CHART NOTE - NSCHARTNOTEFT_GEN_A_CORE
YAA MARISCAL is a 38y (1985) left handed woman with a PMHx significant for asthma, migraines, nephrolithiasis who presents as code stroke after episode of loss of consciousness and found to have facial droop.    Interval events:    MRI brain w/ and w/o Epilepsy protocol- normal   TTE EF 70%, found to have PFO  EEG (5 hours) - Normal, no seizures or epileptiform activity noted     Impression:      Episode of transient loss of consciousness associated with B/L face tingling and facial droop (now resolved), and headache. Non focal neuro exam, symptoms largely improved after patient given migraine cocktail. Prolonged LOC (5 minutes) followed by a period of confusion may have been 2/2 to seizure. Not consistent with acute ischemic infarct, also less consistent with syncopal event     Recommendations:    [] As patient is being discharged, can follow up with Neurology as outpatient for further evaluation. Patient can follow up with Dr. Ziggy Ferguson or Dr. Eliezer Munoz after discharge. Please instruct the patient/family to call [phone] to schedule an appointment within the next 1 week. Office is located at [address] [facility], [place] [address].   [] Given concern for seizure, advise patient to not drive, operate heavy machinery, avoid heights, pools, bathtubs, locked doors until cleared by further follow up outpatient by neurology.  [] Continue home Ubrelvy daily for headaches YAA MARISCAL is a 38y (1985) left handed woman with a PMHx significant for asthma, migraines, nephrolithiasis who presents as code stroke after episode of loss of consciousness and found to have facial droop.    Interval events:    MRI brain w/ and w/o Epilepsy protocol- normal   TTE EF 70%, found to have PFO  EEG (5 hours) - Normal, no seizures or epileptiform activity noted     Impression:      Episode of transient loss of consciousness associated with B/L face tingling and facial droop (now resolved), and headache. Non focal neuro exam, symptoms largely improved after patient given migraine cocktail. Prolonged LOC (5 minutes) followed by a period of confusion may have been 2/2 to seizure. Not consistent with acute ischemic infarct, also less consistent with syncopal event     Recommendations:    [] As patient is being discharged, can follow up closely with Neurology as outpatient for further evaluation. Patient can follow up with Dr. Ziggy Ferguson or Dr. Eliezer Munoz after discharge. Please instruct the patient/family to call [phone] to schedule an appointment within the next 1 week. Office is located at [address] [facility], [place] [address].   [] Given concern for seizure, advise patient to not drive, operate heavy machinery, avoid heights, pools, bathtubs, locked doors until cleared by further follow up outpatient by neurology.  [] Continue home Ubrelvy daily for headaches YAA MARISCAL is a 38y (1985) left handed woman with a PMHx significant for asthma, migraines, nephrolithiasis who presents as code stroke after episode of loss of consciousness and found to have facial droop.    Interval events:    MRI brain w/ and w/o Epilepsy protocol- normal   TTE EF 70%, found to have PFO  EEG (5 hours) - Normal, no seizures or epileptiform activity noted     Impression:      Episode of transient loss of consciousness associated with B/L face tingling and facial droop (now resolved), and headache. Non focal neuro exam, symptoms largely improved after patient given migraine cocktail. Prolonged LOC (5 minutes) followed by a period of confusion may have been 2/2 to seizure. Not consistent with acute ischemic infarct, also less consistent with syncopal event     Recommendations:    [] As patient is being discharged, can follow up closely with Neurology as outpatient for further evaluation. Patient can follow up with Dr. Ziggy Ferguson or Dr. Eliezer Munoz after discharge. Please instruct the patient/family to call [phone] to schedule an appointment within the next 1 week. Office is located at [address] [facility], [place] [address].   [] Given concern for seizure, advise patient to not drive, operate heavy machinery, avoid heights, pools, bathtubs, locked doors until cleared by further follow up outpatient by neurology.  [] Continue home Ubrelvy daily for headaches    Thank you

## 2023-09-29 NOTE — EEG REPORT - NS EEG TEXT BOX
[facility]   COMPREHENSIVE EPILEPSY CENTER   REPORT OF ROUTINE EEG W/ Video     [facility]: [address], [place] [address], Ph#: [phone]  [facility]: [address], [place] [address], Ph#: [phone]  [facility]: [address] [place] [address], Ph#: [phone]    Patient Name: YAA MARISCAL  Age and : 38y ([birth year])  MRN #: [id number]  Location: [place] [address]  Referring Physician: Not Available Doctor    Study Date: 23 (40min)    _____________________________________________________________  TECHNICAL INFORMATION    Placement and Labeling of Electrodes:  The EEG was performed utilizing 20 channels referential EEG connections (coronal over temporal over parasagittal montage) using all standard 10-20 electrode placements with EKG.  Recording was at a sampling rate of 256 samples per second per channel.  Time synchronized digital video recording was done simultaneously with EEG recording.  A low light infrared camera was used for low light recording.  Koby and seizure detection algorithms were utilized.    _____________________________________________________________  HISTORY    Patient is a 38y old  Female who presents with a chief complaint of     PERTINENT MEDICATION:  MEDICATIONS  (STANDING):  sodium chloride 0.9%. 1000 milliLiter(s) (125 mL/Hr) IV Continuous <Continuous>    _____________________________________________________________  STUDY INTERPRETATION    Findings: The background was continuous, spontaneously variable and reactive. During wakefulness, the posterior dominant rhythm consisted of symmetric, well-modulated 10Hz activity, with amplitude to 30 uV, that attenuated to eye opening.  Low amplitude frontal beta was noted in wakefulness.    Background Slowing:  No generalized background slowing was present.    Focal Slowing:   None were present.    Sleep Background:  Drowsiness was characterized by fragmentation, attenuation, and slowing of the background activity.    Sleep was characterized by the presence of vertex waves, symmetric sleep spindles and K-complexes.    Other Non-Epileptiform Findings:  None were present.    Interictal Epileptiform Activity:   None were present.    Events:  Clinical events: None recorded.  Seizures: None recorded.    Activation Procedures:   Hyperventilation was not performed.    Photic stimulation was not performed.     Intermittent myogenic and movement artifacts were noted.    _____________________________________________________________  EEG SUMMARY/CLASSIFICATION    Normal EEG in the awake, drowsy and asleep states.  _____________________________________________________________  EEG IMPRESSION/CLINICAL CORRELATE    Normal EEG study.  No epileptiform pattern or seizure seen.    Pablo Dumont MD  EEG/Epilepsy Attending  [facility]   COMPREHENSIVE EPILEPSY CENTER   REPORT OF ROUTINE EEG W/ Video     [facility]: [address], [place] [address], Ph#: [phone]  [facility]: [address], [place] [address], Ph#: [phone]  [facility]: [address] [place] [address], Ph#: [phone]    Patient Name: YAA MARISCAL  Age and : 38y ([birth year])  MRN #: [id number]  Location: [place] [address]  Referring Physician: Not Available Doctor    Study Date: 23 0917AM (40min)    _____________________________________________________________  TECHNICAL INFORMATION    Placement and Labeling of Electrodes:  The EEG was performed utilizing 20 channels referential EEG connections (coronal over temporal over parasagittal montage) using all standard 10-20 electrode placements with EKG.  Recording was at a sampling rate of 256 samples per second per channel.  Time synchronized digital video recording was done simultaneously with EEG recording.  A low light infrared camera was used for low light recording.  Koby and seizure detection algorithms were utilized.    _____________________________________________________________  HISTORY    Patient is a 38y old  Female who presents with a chief complaint of     PERTINENT MEDICATION:  MEDICATIONS  (STANDING):  sodium chloride 0.9%. 1000 milliLiter(s) (125 mL/Hr) IV Continuous <Continuous>    _____________________________________________________________  STUDY INTERPRETATION    Findings: The background was continuous, spontaneously variable and reactive. During wakefulness, the posterior dominant rhythm consisted of symmetric, well-modulated 10Hz activity, with amplitude to 30 uV, that attenuated to eye opening.  Low amplitude frontal beta was noted in wakefulness.    Background Slowing:  No generalized background slowing was present.    Focal Slowing:   None were present.    Sleep Background:  Drowsiness was characterized by fragmentation, attenuation, and slowing of the background activity.    Sleep was characterized by the presence of vertex waves, symmetric sleep spindles and K-complexes.    Other Non-Epileptiform Findings:  None were present.    Interictal Epileptiform Activity:   None were present.    Events:  Clinical events: None recorded.  Seizures: None recorded.    Activation Procedures:   Hyperventilation was not performed.    Photic stimulation was not performed.     Intermittent myogenic and movement artifacts were noted.    _____________________________________________________________  EEG SUMMARY/CLASSIFICATION    Normal EEG in the awake, drowsy and asleep states.  _____________________________________________________________  EEG IMPRESSION/CLINICAL CORRELATE    Normal EEG study.  No epileptiform pattern or seizure seen.    Pablo Dumont MD  EEG/Epilepsy Attending

## 2023-09-29 NOTE — ED CDU PROVIDER INITIAL DAY NOTE - DETAILS
Tele monitoring, neuro checks, TTE / EEG / recommendations as per Neuro team following patient; electrolyte correction, supportive care, general observation care / monitoring.

## 2023-09-29 NOTE — ED PROVIDER NOTE - CLINICAL SUMMARY MEDICAL DECISION MAKING FREE TEXT BOX
38Y F presenting as code stroke after syncopal episode and concern for unilateral facial droop, last known well approx 1 hour prior to arrival. Neuro resident at bedside for stroke eval, patient reportedly had facial droop on R side for EMS, now noted to have mild droop on L. Will obtain stroke imaging and continue close monitoring. No indication for thrombolytics at this time per EMS. Likely TBA vs CDU pending imaging, neuro final recs.

## 2023-09-29 NOTE — ED CDU PROVIDER INITIAL DAY NOTE - OBJECTIVE STATEMENT
38Y F PMH asthma, migraines, presenting after syncopal episode. Patient reportedly had syncopal episode 1 hour prior to arrival;  states patient was doing dishes, he was in next room over, when he heard a thud, went to check on patient and found her down on the ground. EMS noted facial and arm droop en route. Patient reportedly last known normal at 9PM tonight.  states that patient was endorsing fatigue and mild dizziness throughout the day today, but was able to care for young son and perform other ADLs as normal. On arrival, patient endorsing bilateral facial tingling and generalized weakness. Denies fevers, headache, vision change, dizziness, chest pain, SOB, n/v, diarrhea.    CDU MIGUEL Saenz Note----  ED Provider HPI as above, reviewed / agreed.  In the ED, pt evaluated as code stroke; Neurology evaluated pt bedside accordingly.  VSS, pt afebrile; EKG NSR.  CBC/coags unremarkable.  CMP: potassium 3.1, bicarb 19; CMP otherwise unremarkable.  Troponin <6.  CT head / CTA head/neck were without acute pathology.  Neurology advised TTE, EEG; additional recommendations appreciated; pt was sent to CDU for continued care.

## 2023-09-29 NOTE — ED PROVIDER NOTE - OBJECTIVE STATEMENT
38Y F PMH asthma, migraines, presenting after syncopal episode. 38Y F PMH asthma, migraines, presenting after syncopal episode. Patient reportedly had syncopal episode 1 hour prior to arrival;  states patient was doing dishes, he was in next room over, when he heard a thud, went to check on patient and found her down on the ground. 38Y F PMH asthma, migraines, presenting after syncopal episode. Patient reportedly had syncopal episode 1 hour prior to arrival;  states patient was doing dishes, he was in next room over, when he heard a thud, went to check on patient and found her down on the ground. EMS noted facial and arm droop en route. Patient reportedly last known normal at 9PM tonight.  states that patient was endorsing fatigue and mild dizziness throughout the day today, but was able to care for young son and perform other ADLs as normal. On arrival, patient endorsing bilateral facial tingling and generalized weakness. Denies fevers, headache, vision change, dizziness, chest pain, SOB, n/v, diarrhea.

## 2023-09-29 NOTE — ED CDU PROVIDER DISPOSITION NOTE - NSFOLLOWUPINSTRUCTIONS_ED_ALL_ED_FT
Follow up with your primary care doctor within 2-3 days of ER discharge.  **Bring your discharge papers / test results with you to your follow up appointment.    Please also follow up with your Neurologist and a Cardiologist this coming week; call to schedule appointment.  **Bring your discharge papers / test results with you to your follow up appointment.    If you need assistance finding a doctor to follow up with, you may refer to the attached referral lists, or you may call the [facility] Patient Access Services helpline at [phone] to find names/contact #s for a provider/specialist to follow up with.    Rest / no strenuous activity.  Stay well hydrated.    A copy of your test results is being provided to you.  All results including incidental findings were reviewed at discharge.  Should you have any questions that arise after you are discharged, please feel free to contact the ER ([phone]) to have your questions answered.    MEDICATIONS:  See attached medication sheet for details of prescriptions sent to your pharmacy.  These medication prescription details were reviewed with you; please make sure to take all medications AS PRESCRIBED.    DON'T drive on _______ medication as this can cause drowsiness and slowed reflexes which will put your life and safety at risk as well as the lives/safety of others on the road at risk.  DON'T use any other machinery while on this medication.    ***Return to the Emergency Department IMMEDIATELY if you experience any new / worsening symptoms or have any problems / concerns.*** Follow up with your primary care doctor within 2-3 days of ER discharge.  **Bring your discharge papers / test results with you to your follow up appointment.  ---------------------------------------------------------------------------------------------------------------------  Please also follow up with your Neurologist and a Cardiologist this coming week; call the offices directly to schedule your appointments.    If you need assistance finding a doctor to follow up with, you may refer to the attached referral lists, or you may call the [facility] Patient Access Services helpline at [phone] to find names/contact #s for a provider/specialist to follow up with.  ---------------------------------------------------------------------------------------------------------------------    No strenuous activity.  Get plenty of rest; try to have consistent sleep habits.  Do not skip meals.  Eat well balanced meals.  Stay well hydrated, making sure to drink at least 8 - 10 eight ounce glasses of water daily.    Your potassium was low in the ER (3.1); you were given oral supplementation accordingly.  Eat foods rich in potassium (see attached patient education information).    You had a PFO (patent foramen ovale) incidentally noted on your echocardiogram (ultrasound of your heart).  Please review the attached patient education information, and follow up with a cardiologist for this finding, as well as your diagnosis of syncope.    A copy of your test results is being provided to you.  All results including incidental findings were reviewed at discharge.  Should you have any questions that arise after you are discharged, please feel free to contact the ER ([phone]) to have your questions answered.    MEDICATIONS:  Continue your home medications.  No new prescriptions are indicated at this time.    ***Return to the Emergency Department IMMEDIATELY if you experience any new / worsening symptoms or have any problems / concerns.***

## 2023-09-29 NOTE — ED CDU PROVIDER INITIAL DAY NOTE - PHYSICAL EXAMINATION
CONSTITUTIONAL:  Well appearing, awake, alert, oriented to person, place, time/situation and in no apparent distress.  Pt. is objectively comfortable appearing and verbalizing in full, clear, effortless sentences.  ENMT: Airway patent.  Nasal mucosa clear.  Moist mucous membranes.  Neck supple.  EYES:  Clear OU.  CARDIAC:  Normal rate, regular rhythm.  Heart sounds S1 S2.  No murmurs, gallops, or rubs.  RESPIRATORY:  Breath sounds clear and equal bilaterally.  No wheezes, no rales, no rhonchi.  GASTROINTESTINAL:  Abdomen soft, non-distended, non-tender.  No rebound, no guarding.  NEUROLOGICAL:  Alert and oriented to person/place/time/situation.  No focal deficits; no tremors noted.   MUSCULOSKELETAL:  Range of motion is not limited.    SKIN:  Skin color unremarkable.  Skin warm, dry, and intact.    PSYCHIATRIC:  Alert and oriented to person/place/time/situation.  Mood and affect WNL.  No apparent risk to self or others.

## 2023-09-29 NOTE — ED CDU PROVIDER INITIAL DAY NOTE - ATTENDING APP SHARED VISIT CONTRIBUTION OF CARE
The decision was made to admit this patient to the CDU as documented in my Provider note I have reviewed the note  written by the CDU Physician Assistant, on that visit day. I have supervised and participated as necessary in the performance of procedures indicated for patient management and was available at all phases of the patient´s visit when needed.    Please see the  provider note for the details of the decision to admit  Vital Signs Stable   PE unchanged at time of admission  Patient with uneventful course  Patient admitted to CDU to complete w/u for HA w/ syncope including advanced imaging EEG and final  neuro reccs; Low suspicion for ischemic stroke or SAH ? HP migraine or sz. Will f/u reccs

## 2023-09-29 NOTE — ED CDU PROVIDER DISPOSITION NOTE - PATIENT PORTAL LINK FT
You can access the FollowMyHealth Patient Portal offered by [facility] by registering at the following website: [email or web address]. By joining [facility]’s FollowMyHealth portal, you will also be able to view your health information using other applications (apps) compatible with our system.

## 2023-09-29 NOTE — ED CDU PROVIDER DISPOSITION NOTE - CLINICAL COURSE
39 yo female, PMH asthma, migraines, presented to the ED after syncopal episode at home approx 1 hour prior to arrival. Pt's  had stated that patient was doing dishes, he was in next room over, when he heard a thud, went to check on patient and found her down on the ground. EMS noted facial and arm droop en route. Patient reportedly last known normal at 2100 hrs on 9/28/23.  had stated that patient was endorsing generalized fatigue and mild dizziness throughout the day 9/28/23, but was able to care for young son and perform other ADLs as normal. On arrival, patient endorsed bilateral facial tingling and generalized weakness. Denies fevers, headache, vision change, dizziness, chest pain, SOB, n/v, diarrhea.  In the ED, pt evaluated as code stroke; Neurology evaluated pt bedside accordingly.  VSS, pt afebrile; EKG NSR.  CBC/coags unremarkable.  CMP: potassium 3.1, bicarb 19; CMP otherwise unremarkable.  Troponin <6.  CT head / CTA head/neck were without acute pathology.  Neurology advised TTE, EEG; additional recommendations appreciated; pt was sent to CDU for continued care.  In CDU, TTE showed EF 70%, findings c/w PFO noted; no emergent findings noted.  EEG per Neuro was unremarkable.  MRI head w/wo contrast was performed; per official radiology report: "...IMPRESSION:  No acute intracranial abnormality or focal seizure nidus is noted.".  Neurology gave clearance from Neuro perspective for dc home; ED attending gave medical clearance for dc home.

## 2023-09-29 NOTE — ED PROVIDER NOTE - PHYSICAL EXAMINATION
GENERAL: Awake, appears lethargic  HEAD: NC/AT, neck supple, moist mucous membranes  EYES: PERRL, EOM grossly intact, sclera anicteric  LUNGS: normal WOB on RA, CTAB, no wheezes or crackles   CARDIAC: RRR, no m/r/g  ABDOMEN: Soft, non tender, non distended, no rebound, no guarding  BACK: No midline spinal tenderness, no CVA tenderness  EXT: No edema, no calf tenderness, no deformities.  NEURO: A&Ox3. Moving all extremities. CN II-XII grossly intact bilaterally. Mild left facial droop noted.   SKIN: Warm and dry. No rash.  PSYCH: Normal affect.

## 2023-09-29 NOTE — EEG REPORT - NS EEG TEXT BOX
YAA MARISCAL [id number]     Study Date: 		09-29-23 (10:35)  Duration x Hours:           5.4 hr  --------------------------------------------------------------------------------------------------  History:  CC/ HPI Patient is a 38y old  Female who presents with a chief complaint of   MEDICATIONS  (STANDING):  sodium chloride 0.9%. 1000 milliLiter(s) (125 mL/Hr) IV Continuous <Continuous>    --------------------------------------------------------------------------------------------------  Study Interpretation:    [[[Abbreviation Key:  PDR=alpha rhythm/posterior dominant rhythm. A-P=anterior posterior.  Amplitude: ‘very low’:<20; ‘low’:20-49; ‘medium’:; ‘high’:>150uV.  Persistence for periodic/rhythmic patterns (% of epoch) ‘rare’:<1%; ‘occasional’:1-10%; ‘frequent’:10-50%; ‘abundant’:50-90%; ‘continuous’:>90%.  Persistence for sporadic discharges: ‘rare’:<1/hr; ‘occasional’:1/min-1/hr; ‘frequent’:>1/min; ‘abundant’:>1/10 sec.  RPP=rhythmic and periodic patterns; GRDA=generalized rhythmic delta activity; FIRDA=frontal intermittent GRDA; LRDA=lateralized rhythmic delta activity; TIRDA=temporal intermittent rhythmic delta activity;  LPD=PLED=lateralized periodic discharges; GPD=generalized periodic discharges; BIPDs =bilateral independent periodic discharges; Mf=multifocal; SIRPDs=stimulus induced rhythmic, periodic, or ictal appearing discharges; BIRDs=brief potentially ictal rhythmic discharges >4 Hz, lasting .5-10s; PFA (paroxysmal bursts >13 Hz or =8 Hz <10s).  Modifiers: +F=with fast component; +S=with spike component; +R=with rhythmic component.  S-B=burst suppression pattern.  Max=maximal. N1-drowsy; N2-stage II sleep; N3-slow wave sleep. SSS/BETS=small sharp spikes/benign epileptiform transients of sleep. HV=hyperventilation; PS=photic stimulation]]]    Daily EEG Visual Analysis    FINDINGS:      Background:  Continuity: continuous  Symmetry: symmetric  PDR: 10 Hz activity, with amplitude to 40 uV, that attenuated to eye opening.  Low amplitude frontal beta noted in wakefulness.  Reactivity: present  Voltage: normal, mostly 20-150uV  Anterior Posterior Gradient: present  Other background findings: none  Breach: absent    Background Slowing:  Generalized slowing: none was present.  Focal slowing: none was present.    State Changes:   -Drowsiness noted with increased slowing, attenuation of fast activity, vertex transients.  -Present with N2 sleep transients with symmetric spindles and K-complexes.    Sporadic Epileptiform Discharges:    None    Rhythmic and Periodic Patterns (RPPs):  None     Electrographic and Electroclinical seizures:  None    Other Clinical Events:  None    Activation Procedures:   -Hyperventilation was not performed.    -Photic stimulation was not performed.    Artifacts:  Intermittent myogenic and movement artifacts were noted.    ECG:  The heart rate on single channel ECG was predominantly between xx BPM.    EEG Classification / Summary:  Normal EEG in the awake, drowsy and asleep state.    Clinical Impression:  There were no epileptiform abnormalities recorded.      This is a preliminary fellow impression only pending attending review    Mehran Lainez MD - Epilepsy Fellow YAA MARISCAL [id number]     Study Date: 		09-29-23 (10:35)  Duration x Hours:           5.4 hr  --------------------------------------------------------------------------------------------------  History:  CC/ HPI Patient is a 38y old  Female who presents with a chief complaint of   MEDICATIONS  (STANDING):  sodium chloride 0.9%. 1000 milliLiter(s) (125 mL/Hr) IV Continuous <Continuous>    --------------------------------------------------------------------------------------------------  Study Interpretation:    [[[Abbreviation Key:  PDR=alpha rhythm/posterior dominant rhythm. A-P=anterior posterior.  Amplitude: ‘very low’:<20; ‘low’:20-49; ‘medium’:; ‘high’:>150uV.  Persistence for periodic/rhythmic patterns (% of epoch) ‘rare’:<1%; ‘occasional’:1-10%; ‘frequent’:10-50%; ‘abundant’:50-90%; ‘continuous’:>90%.  Persistence for sporadic discharges: ‘rare’:<1/hr; ‘occasional’:1/min-1/hr; ‘frequent’:>1/min; ‘abundant’:>1/10 sec.  RPP=rhythmic and periodic patterns; GRDA=generalized rhythmic delta activity; FIRDA=frontal intermittent GRDA; LRDA=lateralized rhythmic delta activity; TIRDA=temporal intermittent rhythmic delta activity;  LPD=PLED=lateralized periodic discharges; GPD=generalized periodic discharges; BIPDs =bilateral independent periodic discharges; Mf=multifocal; SIRPDs=stimulus induced rhythmic, periodic, or ictal appearing discharges; BIRDs=brief potentially ictal rhythmic discharges >4 Hz, lasting .5-10s; PFA (paroxysmal bursts >13 Hz or =8 Hz <10s).  Modifiers: +F=with fast component; +S=with spike component; +R=with rhythmic component.  S-B=burst suppression pattern.  Max=maximal. N1-drowsy; N2-stage II sleep; N3-slow wave sleep. SSS/BETS=small sharp spikes/benign epileptiform transients of sleep. HV=hyperventilation; PS=photic stimulation]]]    Daily EEG Visual Analysis    FINDINGS:      Background:  Continuity: continuous  Symmetry: symmetric  PDR: 10 Hz activity, with amplitude to 40 uV, that attenuated to eye opening.  Low amplitude frontal beta noted in wakefulness.  Reactivity: present  Voltage: normal, mostly 20-150uV  Anterior Posterior Gradient: present  Other background findings: none  Breach: absent    Background Slowing:  Generalized slowing: none was present.  Focal slowing: none was present.    State Changes:   -Drowsiness noted with increased slowing, attenuation of fast activity, vertex transients.  -Present with N2 sleep transients with symmetric spindles and K-complexes.    Sporadic Epileptiform Discharges:    None    Rhythmic and Periodic Patterns (RPPs):  None     Electrographic and Electroclinical seizures:  None    Other Clinical Events:  None    Activation Procedures:   -Hyperventilation was not performed.    -Photic stimulation was not performed.    Artifacts:  Intermittent myogenic and movement artifacts were noted.    ECG:  The heart rate on single channel ECG was predominantly between xx BPM.    EEG Classification / Summary:  Normal EEG in the awake, drowsy and asleep state.    Clinical Impression:  There were no epileptiform abnormalities recorded.      Mehran Lainez MD - Epilepsy Fellow    Jay Jay Mckeon MD PhD  Director, Epilepsy Division, [facility] EEG Reading Room Ph#: [phone]  Epilepsy Answering Service after 5PM and before 8:30AM: Ph#: [phone]

## 2023-09-30 VITALS
SYSTOLIC BLOOD PRESSURE: 105 MMHG | HEART RATE: 71 BPM | RESPIRATION RATE: 18 BRPM | OXYGEN SATURATION: 100 % | DIASTOLIC BLOOD PRESSURE: 66 MMHG | TEMPERATURE: 98 F

## 2024-10-25 NOTE — ED ADULT TRIAGE NOTE - INTERNATIONAL TRAVEL
Group Topic: BH Therapeutic Activity    Date: 10/25/2024  Start Time: 1000  End Time: 1100  Facilitators: Ondina Spain CNA    Focus: Self Reflection Self Portraits   Number in attendance: 3    Method: Group  Attendance: Present  Participation: Active  Patient Response: Appropriate feedback  Mood: Normal  Affect: Type: Euthymic (normal mood)   Range: Full (normal)   Congruency: Congruent   Stability: Stable  Behavior/Socialization: Appropriate to group and Cooperative  Thought Process: Circumstantial  Task Performance: Follows directions  Patient Evaluation: Independent - full participation     No

## 2024-11-22 NOTE — ED ADULT NURSE NOTE - EXTENSIONS OF SELF_ADULT
-- DO NOT REPLY / DO NOT REPLY ALL --  -- This inbox is not monitored. If this was sent to the wrong provider or department, reroute message to P [facility] Reroute pool. --  -- Message is from Engagement Center Operations ([facility]) --    Patient called to see if lab results were available yet.  Please call to advise,  Thank you!             None